# Patient Record
Sex: FEMALE | Race: BLACK OR AFRICAN AMERICAN | NOT HISPANIC OR LATINO | Employment: STUDENT | URBAN - METROPOLITAN AREA
[De-identification: names, ages, dates, MRNs, and addresses within clinical notes are randomized per-mention and may not be internally consistent; named-entity substitution may affect disease eponyms.]

---
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
## 2024-09-22 ENCOUNTER — HOSPITAL ENCOUNTER (EMERGENCY)
Facility: OTHER | Age: 24
Discharge: HOME | End: 2024-09-22
Attending: EMERGENCY MEDICINE
Payer: MEDICAID

## 2024-09-22 VITALS
WEIGHT: 200 LBS | BODY MASS INDEX: 32.14 KG/M2 | DIASTOLIC BLOOD PRESSURE: 61 MMHG | TEMPERATURE: 99 F | RESPIRATION RATE: 17 BRPM | HEART RATE: 93 BPM | HEIGHT: 66 IN | SYSTOLIC BLOOD PRESSURE: 131 MMHG | OXYGEN SATURATION: 96 %

## 2024-09-22 DIAGNOSIS — N76.0 ACUTE VAGINITIS: Primary | ICD-10-CM

## 2024-09-22 LAB
B-HCG UR QL: NEGATIVE
BACTERIA #/AREA URNS HPF: ABNORMAL /HPF
BACTERIA GENITAL QL WET PREP: ABNORMAL
BILIRUB UR QL STRIP: NEGATIVE
CAOX CRY URNS QL MICRO: ABNORMAL
CLARITY UR: ABNORMAL
CLUE CELLS VAG QL WET PREP: ABNORMAL
COLOR UR: YELLOW
CTP QC/QA: YES
FILAMENT FUNGI VAG WET PREP-#/AREA: ABNORMAL
GLUCOSE UR QL STRIP: NEGATIVE
HGB UR QL STRIP: ABNORMAL
HYALINE CASTS #/AREA URNS LPF: 0 /LPF
KETONES UR QL STRIP: NEGATIVE
LEUKOCYTE ESTERASE UR QL STRIP: ABNORMAL
MICROSCOPIC COMMENT: ABNORMAL
NITRITE UR QL STRIP: NEGATIVE
PH UR STRIP: 7 [PH] (ref 5–8)
PROT UR QL STRIP: ABNORMAL
RBC #/AREA URNS HPF: 91 /HPF (ref 0–4)
SP GR UR STRIP: 1.02 (ref 1–1.03)
SPECIMEN SOURCE: ABNORMAL
SQUAMOUS #/AREA URNS HPF: 1 /HPF
T VAGINALIS GENITAL QL WET PREP: ABNORMAL
UNIDENT CRYS URNS QL MICRO: ABNORMAL
URN SPEC COLLECT METH UR: ABNORMAL
UROBILINOGEN UR STRIP-ACNC: NEGATIVE EU/DL
WBC #/AREA URNS HPF: 3 /HPF (ref 0–5)
WBC #/AREA VAG WET PREP: ABNORMAL
YEAST GENITAL QL WET PREP: ABNORMAL
YEAST URNS QL MICRO: ABNORMAL

## 2024-09-22 PROCEDURE — 87591 N.GONORRHOEAE DNA AMP PROB: CPT | Performed by: EMERGENCY MEDICINE

## 2024-09-22 PROCEDURE — 87491 CHLMYD TRACH DNA AMP PROBE: CPT | Performed by: EMERGENCY MEDICINE

## 2024-09-22 PROCEDURE — 81025 URINE PREGNANCY TEST: CPT | Performed by: EMERGENCY MEDICINE

## 2024-09-22 PROCEDURE — 81000 URINALYSIS NONAUTO W/SCOPE: CPT | Performed by: EMERGENCY MEDICINE

## 2024-09-22 PROCEDURE — 87210 SMEAR WET MOUNT SALINE/INK: CPT | Performed by: EMERGENCY MEDICINE

## 2024-09-22 PROCEDURE — 87086 URINE CULTURE/COLONY COUNT: CPT | Performed by: EMERGENCY MEDICINE

## 2024-09-22 PROCEDURE — 99284 EMERGENCY DEPT VISIT MOD MDM: CPT

## 2024-09-22 NOTE — ED PROVIDER NOTES
Encounter Date: 9/22/2024       History     Chief Complaint   Patient presents with    Vaginal Discharge     C/o vaginal itching and irritations, requesting std screening,      Seen by physician at 7:01AM:    Patient is a 24-year-old female who presents to the emergency department with vaginal itching  Patient states that her symptoms started yesterday.  She tried the intravaginal capsule of miconazole but she feels like that made her irritation worse and now she is having discharge.  She denies any urinary symptoms.  She is sexually active with her boyfriend.  She is concerned that she may have a sexually transmitted infection.  Denies fevers/chills.        Review of patient's allergies indicates:  No Known Allergies  Past Medical History:   Diagnosis Date    Asthma     UTI (urinary tract infection)      History reviewed. No pertinent surgical history.  No family history on file.  Social History     Tobacco Use    Smoking status: Never    Smokeless tobacco: Never   Substance Use Topics    Alcohol use: No    Drug use: Yes     Types: Marijuana     Comment: daily     Review of Systems   Constitutional:  Negative for chills and fever.   Genitourinary:  Positive for vaginal discharge. Negative for dysuria.        Vaginal itching   Musculoskeletal:  Negative for back pain and neck pain.   Skin:  Negative for color change and rash.   Neurological:  Negative for dizziness and headaches.       Physical Exam     Initial Vitals [09/22/24 0629]   BP Pulse Resp Temp SpO2   131/72 102 20 98.2 °F (36.8 °C) 98 %      MAP       --         Physical Exam    Nursing note and vitals reviewed.  Constitutional: She appears well-developed and well-nourished.   HENT:   Head: Normocephalic and atraumatic.   Eyes: Conjunctivae are normal.   Pulmonary/Chest: No respiratory distress.   Abdominal: Abdomen is soft. Bowel sounds are normal. She exhibits no distension. There is no abdominal tenderness. There is no rebound.   Genitourinary:     Genitourinary Comments: Pelvic exam:  No external lesions or vesicles.  Thick white substance noted in the vaginal vault.  No blood.  No CMT or adnexal tenderness bilaterally     Musculoskeletal:         General: Normal range of motion.     Neurological: She is alert and oriented to person, place, and time.   Ambulatory with steady gait.   Skin: Skin is warm and dry. Capillary refill takes less than 2 seconds.         ED Course   Procedures  Labs Reviewed   VAGINAL SCREEN - Abnormal       Result Value    Trichomonas None      Clue Cells None      Budding Yeast None      Fungal Hyphae None      WBC - Vaginal Screen None      Bacteria - Vaginal Screen Few (*)     Wet Prep Source Vagina      Narrative:     Release to patient->Immediate   URINALYSIS - Abnormal    Specimen UA Urine, Clean Catch      Color, UA Yellow      Appearance, UA Hazy (*)     pH, UA 7.0      Specific Gravity, UA 1.025      Protein, UA 1+ (*)     Glucose, UA Negative      Ketones, UA Negative      Bilirubin (UA) Negative      Occult Blood UA 3+ (*)     Nitrite, UA Negative      Urobilinogen, UA Negative      Leukocytes, UA 3+ (*)    URINALYSIS MICROSCOPIC - Abnormal    RBC, UA 91 (*)     WBC, UA 3      Bacteria Few (*)     Yeast, UA Few (*)     Squam Epithel, UA 1      Hyaline Casts, UA 0      Ca Oxalate Serina, UA Few      Unclass Serina UA Rare      Microscopic Comment SEE COMMENT     CULTURE, URINE   CULTURE, URINE   C. TRACHOMATIS/N. GONORRHOEAE BY AMP DNA   POCT URINE PREGNANCY    POC Preg Test, Ur Negative       Acceptable Yes            Imaging Results    None          Medications - No data to display  Medical Decision Making  7:01AM  Patient is a 24-year-old female who presents to the emerge department with vaginal discharge and itching.  Patient appears well, nontoxic.  Her abdomen is soft, nontender.  On her pelvic exam she does have thick white substance which I suspect is the miconazole insert which will likely affect the  vaginal screen findings.  Will plan to check a urine, pelvic labs, will continue to follow and reassess.    Amount and/or Complexity of Data Reviewed  External Data Reviewed: notes.  Labs: ordered. Decision-making details documented in ED Course.    9:36 AM:  Patient doing well, remains stable.  Her vaginal screen is negative though the significant amount of miconazole may have obscured results.  However she did treat herself appropriately even if she does have candidiasis.  She is on her menstrual cycle so her urine does show 3+ blood but there is also 3+ leukocyte esterase.  However given that she was not having urinary symptoms, will defer treatment and await urine culture.  She defers prophylactic treatment for gonorrhea and chlamydia and will await results for the as well.  I do not feel that further work up in the ED is indicated at this time.  I updated pt regarding results and I counseled pt regarding supportive care measures.  I have discussed with the pt ED return warnings and need for close PCP f/u.  Pt agreeable to plan and all questions answered.  I feel that pt is stable for discharge and management as an outpatient and no further intervention is needed at this time.  Pt is comfortable returning to the ED if needed.  Will DC home in stable condition.                                    Clinical Impression:  Final diagnoses:  [N76.0] Acute vaginitis (Primary)          ED Disposition Condition    Discharge Stable          ED Prescriptions    None       Follow-up Information       Follow up With Specialties Details Why Contact Info    Primary Care Physician                 Tonia Tran MD  09/22/24 [id number]

## 2024-09-22 NOTE — ED TRIAGE NOTES
Jesica Farr, a 24 y.o. female presents to the ED c/o vaginal burning sensation and itching after she used Miconazole 1 vaginal insert after thinking that she has vaginal yeast infection. Denies any dysuria.    Patient is A&Ox4. Denies any other complaints. ED workup in progress. VSS. Safety measures in place; side rails up x2. Call light within pt reach. Plan of care ongoing.    Chief Complaint   Patient presents with    Vaginal Discharge     C/o vaginal itching and irritations, requesting std screening,

## 2024-09-23 LAB
C TRACH DNA SPEC QL NAA+PROBE: NOT DETECTED
N GONORRHOEA DNA SPEC QL NAA+PROBE: NOT DETECTED

## 2024-09-24 LAB — BACTERIA UR CULT: NO GROWTH
